# Patient Record
Sex: FEMALE | Race: WHITE | NOT HISPANIC OR LATINO | URBAN - METROPOLITAN AREA
[De-identification: names, ages, dates, MRNs, and addresses within clinical notes are randomized per-mention and may not be internally consistent; named-entity substitution may affect disease eponyms.]

---

## 2020-01-10 ENCOUNTER — IMPORTED ENCOUNTER (OUTPATIENT)
Dept: URBAN - METROPOLITAN AREA CLINIC 50 | Facility: CLINIC | Age: 80
End: 2020-01-10

## 2020-02-17 ENCOUNTER — IMPORTED ENCOUNTER (OUTPATIENT)
Dept: URBAN - METROPOLITAN AREA CLINIC 50 | Facility: CLINIC | Age: 80
End: 2020-02-17

## 2021-01-14 ENCOUNTER — IMPORTED ENCOUNTER (OUTPATIENT)
Dept: URBAN - METROPOLITAN AREA CLINIC 50 | Facility: CLINIC | Age: 81
End: 2021-01-14

## 2021-01-25 ENCOUNTER — IMPORTED ENCOUNTER (OUTPATIENT)
Dept: URBAN - METROPOLITAN AREA CLINIC 50 | Facility: CLINIC | Age: 81
End: 2021-01-25

## 2021-02-16 ENCOUNTER — IMPORTED ENCOUNTER (OUTPATIENT)
Dept: URBAN - METROPOLITAN AREA CLINIC 50 | Facility: CLINIC | Age: 81
End: 2021-02-16

## 2021-04-17 ASSESSMENT — VISUAL ACUITY
OS_OTHER: 20/80. 20/100.
OS_CC: 20/40
OS_CC: J1@ 12 IN
OS_CC: 20/30
OS_CC: J1
OD_CC: 20/40
OD_CC: 20/30
OS_CC: 20/30-
OD_BAT: 20/70
OS_CC: 20/40-2
OD_CC: J1
OS_CC: J1
OS_CC: 20/70
OS_BAT: 20/80
OD_CC: J1
OD_CC: 20/40-2
OD_CC: 20/50
OS_CC: J1+/-
OD_CC: 20/40
OD_CC: J1@ 12 IN
OD_CC: J1+/-
OD_OTHER: 20/70. 20/100.

## 2021-04-17 ASSESSMENT — TONOMETRY
OD_IOP_MMHG: 14
OD_IOP_MMHG: 14
OD_IOP_MMHG: 15
OS_IOP_MMHG: 14
OD_IOP_MMHG: 14
OS_IOP_MMHG: 14
OS_IOP_MMHG: 15
OS_IOP_MMHG: 14

## 2022-01-04 ENCOUNTER — PREPPED CHART (OUTPATIENT)
Dept: URBAN - METROPOLITAN AREA CLINIC 49 | Facility: CLINIC | Age: 82
End: 2022-01-04

## 2022-01-13 ENCOUNTER — COMPREHENSIVE EXAM (OUTPATIENT)
Dept: URBAN - METROPOLITAN AREA CLINIC 48 | Facility: CLINIC | Age: 82
End: 2022-01-13

## 2022-01-13 DIAGNOSIS — H35.3131: ICD-10-CM

## 2022-01-13 DIAGNOSIS — H53.2: ICD-10-CM

## 2022-01-13 DIAGNOSIS — H43.813: ICD-10-CM

## 2022-01-13 PROCEDURE — 92134 CPTRZ OPH DX IMG PST SGM RTA: CPT

## 2022-01-13 PROCEDURE — 92014 COMPRE OPH EXAM EST PT 1/>: CPT

## 2022-01-13 ASSESSMENT — VISUAL ACUITY
OS_CC: 20/40
OU_CC: J1
OD_CC: 20/40-1

## 2022-01-13 ASSESSMENT — TONOMETRY
OS_IOP_MMHG: 15
OD_IOP_MMHG: 14

## 2022-01-13 NOTE — PATIENT DISCUSSION
Educated on [id number] [facility] grid. Advised to call immediately if any worsening distortion or blurring is noted.

## 2024-03-19 ENCOUNTER — COMPREHENSIVE EXAM (OUTPATIENT)
Dept: URBAN - METROPOLITAN AREA CLINIC 48 | Facility: LOCATION | Age: 84
End: 2024-03-19

## 2024-03-19 DIAGNOSIS — H35.3131: ICD-10-CM

## 2024-03-19 DIAGNOSIS — H43.813: ICD-10-CM

## 2024-03-19 DIAGNOSIS — H18.451: ICD-10-CM

## 2024-03-19 DIAGNOSIS — H53.2: ICD-10-CM

## 2024-03-19 DIAGNOSIS — H04.123: ICD-10-CM

## 2024-03-19 PROCEDURE — 92134 CPTRZ OPH DX IMG PST SGM RTA: CPT

## 2024-03-19 PROCEDURE — 99214 OFFICE O/P EST MOD 30 MIN: CPT

## 2024-03-19 ASSESSMENT — KERATOMETRY
OS_K2POWER_DIOPTERS: 47.25
OD_AXISANGLE2_DEGREES: 50
OD_AXISANGLE_DEGREES: 140
OS_AXISANGLE2_DEGREES: 90
OS_K1POWER_DIOPTERS: 46.00
OS_AXISANGLE_DEGREES: 180
OD_K1POWER_DIOPTERS: 45.25
OD_K2POWER_DIOPTERS: 46.00

## 2024-03-19 ASSESSMENT — VISUAL ACUITY
OS_CC: 20/60
OS_PH: 20/40-2
OU_CC: J2@14
OD_CC: 20/50
OD_PH: 20/30-2

## 2024-03-19 ASSESSMENT — TONOMETRY
OD_IOP_MMHG: 15
OS_IOP_MMHG: 15